# Patient Record
Sex: MALE | Race: WHITE | URBAN - NONMETROPOLITAN AREA
[De-identification: names, ages, dates, MRNs, and addresses within clinical notes are randomized per-mention and may not be internally consistent; named-entity substitution may affect disease eponyms.]

---

## 2021-06-26 ENCOUNTER — OFFICE VISIT (OUTPATIENT)
Dept: PRIMARY CARE CLINIC | Age: 36
End: 2021-06-26

## 2021-06-26 VITALS
HEART RATE: 60 BPM | DIASTOLIC BLOOD PRESSURE: 76 MMHG | WEIGHT: 170 LBS | BODY MASS INDEX: 29.02 KG/M2 | SYSTOLIC BLOOD PRESSURE: 128 MMHG | OXYGEN SATURATION: 99 % | HEIGHT: 64 IN | TEMPERATURE: 97.9 F

## 2021-06-26 DIAGNOSIS — R63.1 INCREASED THIRST: ICD-10-CM

## 2021-06-26 DIAGNOSIS — R06.02 SOB (SHORTNESS OF BREATH): ICD-10-CM

## 2021-06-26 DIAGNOSIS — G43.909 MIGRAINE WITHOUT STATUS MIGRAINOSUS, NOT INTRACTABLE, UNSPECIFIED MIGRAINE TYPE: Primary | ICD-10-CM

## 2021-06-26 LAB
BILIRUBIN, POC: NORMAL
BLOOD URINE, POC: NORMAL
CHP ED QC CHECK: PRESENT
CLARITY, POC: CLEAR
COLOR, POC: YELLOW
GLUCOSE BLD-MCNC: 96 MG/DL
GLUCOSE URINE, POC: NORMAL
KETONES, POC: NORMAL
LEUKOCYTE EST, POC: NORMAL
NITRITE, POC: NORMAL
PH, POC: 7.5
PROTEIN, POC: NORMAL
SPECIFIC GRAVITY, POC: 1.01
UROBILINOGEN, POC: 0.2

## 2021-06-26 PROCEDURE — 99202 OFFICE O/P NEW SF 15 MIN: CPT | Performed by: NURSE PRACTITIONER

## 2021-06-26 PROCEDURE — 82962 GLUCOSE BLOOD TEST: CPT | Performed by: NURSE PRACTITIONER

## 2021-06-26 PROCEDURE — 81002 URINALYSIS NONAUTO W/O SCOPE: CPT | Performed by: NURSE PRACTITIONER

## 2021-06-26 RX ORDER — NAPROXEN 500 MG/1
500 TABLET ORAL 2 TIMES DAILY PRN
Qty: 20 TABLET | Refills: 0 | Status: SHIPPED | OUTPATIENT
Start: 2021-06-26 | End: 2021-07-06

## 2021-06-26 RX ORDER — NAPROXEN 250 MG/1
250 TABLET ORAL 2 TIMES DAILY WITH MEALS
COMMUNITY
End: 2021-06-26 | Stop reason: DRUGHIGH

## 2021-06-26 NOTE — PATIENT INSTRUCTIONS
indicaciones. ? Si no está tomando un analgésico recetado, pregúntele a allen médico si puede mack saqib de [employer]. · Tenga cuidado de no mack analgésicos con mayor frecuencia que la permitida en las indicaciones porque los julio de ron podrían empeorar o aparecer con mayor frecuencia flor vez que el medicamento pierda allen [place]. · Preste atención a los nuevos síntomas que aparecen con el dolor de [place], [place], debilidad o entumecimiento, cambios en la visión o confusión. Podrían ser señales de un problema más grave. Para prevenir los julio de ron  · [employer] un diario de elijah julio de ron para que pueda averiguar qué los desencadena. Evitar los desencadenantes podría ayudar a prevenir los julio de [place]. Anote cuándo empieza cada dolor de [place], cuánto dura y cómo es el dolor (palpitante, emily, punzante o sordo). Anote cualquier otro síntoma que haya tenido con el dolor de [place], [place] náuseas, destellos de dolores o SIRI, o sensibilidad a la dolores brillante o a los ruidos kojo. Anote si el dolor de ron ocurrió cerca de allen menstruación. Enumere todos los factores que pudieran leelee desencadenado el dolor de [place], vaishali ciertos alimentos (chocolate, queso, vino) u olores, humo, luces brillantes, estrés o falta de sueño. · Encuentre maneras saludables de [facility]. Los julio de [place] son más comunes mio o simin después de un momento estresante. Tómese un tiempo para relajarse antes y después de hacer algo que le haya causado un dolor de ron en el pasado. · Trate de mantener elijah músculos relajados mediante flor buena postura. Revise si tiene [employer] de la May, la ezequiel, el barry y los hombros y trate de relajarlos. Cuando se siente en un escritorio, cambie de posición con frecuencia y estírese por 27 segundos cada hora. · Suzanne suficiente ejercicio y duerma bastante. · Coma en forma regular y tana.  Largos períodos sin comer pueden provocar un dolor de ron. · Regálese un masaje. Algunas personas encuentran que los masajes hechos con regularidad son M[employer] para aliviar la tensión. · Limite la cafeína. No demetrius demasiado café, té ni sodas. Gavin no deje de consumir cafeína de repente, porque eso también puede provocarle julio de [place]. · Reduzca la tensión en los ojos a causa de la computadora parpadeando con frecuencia y apartando la mirada de la pantalla a menudo. Asegúrese de tener lentes adecuados y de que allen monitor esté colocado de manera correcta, vaishali a un brazo de distancia. · Busque ayuda si tiene depresión o ansiedad. Lizzy julio de [place] podrían relacionarse con estas afecciones. El tratamiento puede prevenir los julio de [place] y ayudar con los síntomas de ansiedad o depresión. ¿Cuándo debe pedir ayuda? Llame al 911 en cualquier momento que piense que puede necesitar atención de emergencia. Por ejemplo, llame si:    · Tiene señales de un ataque cerebral. Estas pueden incluir:  ? Parálisis, entumecimiento o debilidad repentinos en la ezequiel, el brazo o la pierna, sobre todo si ocurre en un solo lado del cuerpo. ? Cambios repentinos en la visión. ? Dificultades repentinas para hablar. ? Confusión repentina o dificultad para comprender frases sencillas. ? Problemas repentinos para caminar o mantener el equilibrio. ? Dolor de [place] intenso y repentino, distinto de los julio de [place]. Llame a allen médico ahora mismo o busque atención médica inmediata si:    · Tiene un dolor de ron nuevo o peor.     · Allen dolor de ron empeora mucho. ¿Dónde puede encontrar más información en inglés? Addi Gricel a [email or web address] e ingrese a allen cuenta de MyChart. Laney Garcia F388 en el Martir Schw[employer] \"Search Health Information\" para más información (en inglés) sobre \"Dolor de ron: Instrucciones de cuidado. \"     Si no tiene flor cuenta, maximo blas en el enlace \"Sign Up Now\".   Revisado: 4 cuco, 2020               Versión del contenido: 12.9  © 2006-2021 Healthwise, Incorporated. Las instrucciones de cuidado fueron adaptadas bajo licencia por [facility] ([facility]). Si usted tiene [place] afección médica o sobre estas instrucciones, siempre pregunte a allen profesional de vianca. Healthwise, Incorporated niega toda garantía o responsabilidad por allen uso de esta información. Patient Education        naproxen  Jose:  Nasima Anaprox-DS, Midol Extended Relief, Naprelan 500, Naprosyn  ¿Cuál es la información más importante que nathaniel saber sobre naproxen? Naproxen puede aumentar allen riesgo de ataques al corazón o accidentes cerebrovasculares fatales. No use esta medicina simin antes o después de Morgan de lopez (bypass; aortocoronario con injerto, o CABG, por elijah siglas en inglés). Naproxen también puede causar sangrado del estómago o intestino, lo que puede ser fatal.  Endy Dust es naproxen? Naproxen es flor droga antiinflamatoria no esteroide (BRENDA; NSAID por elijah siglas en inglés). Naproxen se [place] para tratar el dolor o la inflamación causada por condiciones vaishali artritis, espondilitis anquilosante, bursitis, tendinitis, gota, o cólicos menstruales. Las tabletas de liberación retardada o de liberación prolongada son formas de naproxen de acción lenta que solo se usan para el tratamiento de enfermedades crónicas vaishali la artritis o la espondilitis anquilosante. Estas formas de naproxen no funcionan suficientemente rápido para tratar el dolor hafsa. Naproxen puede también usarse para fines no mencionados en esta guía del medicamento. ¿Qué debería discutir con [employer] del cuidado de la vianca antes de mack naproxen? Naproxen puede aumentar allen riesgo de ataques al corazón o accidentes cerebrovasculares fatales, aún si no tiene cualquier factores de riesgo.  No use esta medicina simin antes o después de Morgan de lopez (bypass; aortocoronario con injerto, o CABG, por elijah siglas en gopi). Naproxen también puede causar sangrado del estómago o intestino, lo que puede ser fatal. Estas condiciones pueden ocurrir sin ninguna advertencia mientras esté usando naproxen, especialmente en [place]. Usted no debe usar naproxen si es alérgico a éste, o si usted alguna vez ha tenido un ataque de asma o reacción alérgica severa después de pro aspirin o un BRENDA. Pregúntele a un médico antes de administrar naproxen a un valeri gonzalo de 12 años de [place]. Pregúntele a allen médico o farmacéutico si esta medicina es seguro de usar si usted tiene:  · enfermedad del corazón, presión arterial gigi, colesterol elevado, diabetes, o si fuma;  · un ataque al corazón, accidente cerebrovascular, o coágulo sanguíneo;  · úlceras o sangrados en el estómago;  · asma;  · enfermedad del hígado o riñón;  · retención de líquido; o  · si usted mercedes aspirin para prevenir un ataque al corazón o un accidente cerebrovascular. Si está embarazada, no debe pro naproxen a menos que allen médico se lo indique. Pro un Google las últimas 20 semanas del embarazo puede causar graves problemas del corazón o de los riñones en el carli cele y posibles complicaciones con allen embarazo. Es posible que no sea seguro amamantar mientras está usando esta [place]. Pregúntele a allen médico sobre cualquier riesgo. ¿Cómo nathaniel pro naproxen? Use exactamente vaishali indicado en la etiqueta, o vaisahli lo haya recetado allen médico. Use la dosis más baja que es efectiva en el tratamiento de allen condición. Agite la suspensión oral (líquida) antes de [employer] dosis. Use la jeringa de medición que viene con allen medicina, o use un dispositivo para la medición de dosis (no flor cuchara casera). [place] esta medicina con comida o leche si le molesta allen estómago. Siempre siga las direcciones en la etiqueta de la medicina sobre cómo darle esta medicina a un valeri. Las dosis de naproxen son basadas en el peso en los niños.  La dosis que allen valeri necesita puede cambiar si el valeri [address] Si usted [place] esta medicina a sunil plazo, usted puede necesitar pruebas médicas frecuentes. Esta medicina puede afectar los resultados de ciertas pruebas médicas. Dígale a cualquier médico que lo [place] que usted está usando naproxen. Guarde a temperatura ambiente fuera de la humedad, el calor, y la dolores. Mantenga la botella tana cerrada cuando no la esté usando. ¿Qué sucede si me mason flor dosis? Ya que naproxen se [place] cuando se necesita, usted gordon vez no esté en un horario de dosis. [place]ese cualquier dosis que dejó de usar si ya maddie es hora para la próxima dosis. No use dos dosis a la vez. Arthea Pear en flor sobredosis? Busque atención médica de emergencia o llame a la línea de [facility] al [phone].  ¿Qué nathaniel evitar mientras migdalia naproxen? Evite beber alcohol. Le puede aumentar allen riesgo de sangrado en el estómago. Evite mack aspirin a menos que allen médico se lo indique. Pregúntele a un médico o farmacéutico antes de usar otras medicinas para el dolor, fiebre, hinchazón, o síntomas de resfrío/gripe. Éstas pueden contener ingredientes similares a naproxen (vaishali aspirin, ibuprofen, o ketoprofen). Pregúntele a allen doctor antes de usar un antiácido, y use solamente el que allen médico recomienda. Algunos antiácidos pueden hacer más difícil que allen cuerpo absorba naproxen. ¿Cuáles son los efectos secundarios posibles de naproxen? Busque atención médica de emergencia si usted tiene signos de [place] reacción alérgica (nariz mocosa o congestionada, sibilancias o dificultad para respirar, ronchas, hinchazón en la ezequiel o garganta) o flor reacción severa de la piel (fiebre, dolor de garganta, quemazón en elijah ojos, dolor de la piel, sarpullido gama o púrpura con ampollas y descamación).   Busque ayuda médica de emergencia si usted tiene signos de un ataque al corazón o accidente cerebrovascular: dolor del pecho que se extiende a la mandíbula u hombro, entumecimiento o debilidad repentina en un lado de allen cuerpo, habla arrastrada, hinchazón en las piernas, sensación de que le falta aire al respirar. Deje de usar naproxen y llame a allen médico de inmediato si usted tiene:  · falta de aire al respirar (aún cuando la actividad es leve);  · hinchazón o aumento rápido de peso;  · el primer signo de cualquier sarpullido o ampolla, no importa que sea muy leve;  · signos de [employer] --heces con abner o alquitranadas, tos con abner o vómito que parece café molido;  · problemas del hígado --náusea, dolor en la parte superior del [place], pérdida del apetito, [place], heces de color arcilla, ictericia (color amarillo de la piel u ojos);  · problemas del riñón --orinar poco o nada, dolor al orinar, hinchazón en elijah pies o tobillos; o  · bajo conteo de glóbulos rojos (anemia) --piel pálida, cansancio inusual, sentir que se va a desmayar o le falta aire al respirar, massimo y pies fríos. Efectos secundarios comunes pueden incluir:  · dolor de [place];  · indigestión, acidez, dolor de [place]; o  · síntomas de la gripe. Esta lista no menciona todos los efectos secundarios y puede ser que ocurran otros. Llame a allen médico para consejos médicos relacionados a efectos secundarios. [facility] puede reportar efectos secundarios llamando al FDA al 1-800-FDA-1088. ¿Qué otras drogas afectarán a naproxen? Pregúntele a allen médico antes de usar naproxen si usted mercedes un antidepresivo. Pro ciertos antidepresivos con un BRENDA puede causarle moretones o sangrado fácil.   [place] a un médico o farmacéutico antes de usar naproxen con cualquier otra medicina, especialmente:  · otros AINEs o salicylates (diflunisal, salsalate);  · antiácidos o sucralfate;  · cholestyramine;  · cyclosporine;  · digoxin;  · lithium;  · methotrexate;  · pemetrexed;  · probenecid;  · warfarin (Coumadin, Jantoven) o anticoagulante similares;  · un diurético o \"pastilla para eliminar el agua\"; aquí no se ha creado con la intención de cubrir todos los usos posibles, instrucciones, precauciones, advertencias, interacciones con otras drogas, reacciones alérgicas, o efectos secundarios. Si usted tiene alguna pregunta acerca de las drogas que está tomando, consulte con allen médico, [place], o farmacéutico.  Copyright 1008-2856 [facility] Version: 18.01. Revision date: 12/29/2020. Las instrucciones de cuidado fueron adaptadas bajo licencia por [facility] CARE ([facility]). Si usted tiene [place] afección médica o sobre estas instrucciones, siempre pregunte a allen profesional de vianca. [facility] niega toda garantía o responsabilidad por allen uso de esta información.

## 2021-06-26 NOTE — PROGRESS NOTES
Chief Complaint:   Fatigue (Patient complains of fatigue x 3 days. Complains of feeling weak. ) and Headache (Patient complains of headache x 3 days. No fever. )      History of Present Illness   Source of history provided by: patient and private  Keely Walker. Discussed and offered  phone, patient declines. Larissa Valenzuela is a 29 y.o. old male who has a past medical history of: History reviewed. No pertinent past medical history. presents to the walk in clinic for evaluation of a headache which has been present x 3 days. Reports associated photophobia. HA \"x3 days and feels tired and a little bit weak most of the day. Once he starts working, feels more tired and this is unusual.  A lot of thirst.  Upper back and shoulder pain with head ache. Thirst seems to be increased from his usual, for the last 2-3 days. Dry mouth. Has lost some weight, \"5 kgs\". Some SOB. No nausea or vomiting episodes. Since recognized, the symptoms have been improving. Denies this being the worst HA of his/her life. Pt denies any recent falls, trauma, dizziness, syncope, CP, palpitations, nasal congestion, visual loss, unilateral weakness, fever, neck stiffness, or recent illness. Denies HA now, improved. HA is described as \"all over head, ometimes dizzy and funny vision in one of my eyes\" then head starts. Using Naproxen OTC with some results. 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 No PCP. Will be in [facility] until November. Arrived to [place] in May from [facility]. Fasting now.     ROS   Unless otherwise stated in this report or unable to obtain because of the patient's clinical or mental status as evidenced by the medical record, this patients's positive and negative responses for Review of Systems, constitutional, psych, eyes, ENT, cardiovascular, respiratory, gastrointestinal, neurological, genitourinary, musculoskeletal, integument systems all orders for this visit:    Migraine without status migrainosus, not intractable, unspecified migraine type  -     naproxen (NAPROSYN) 500 MG tablet; Take 1 tablet by mouth 2 times daily as needed (for headache. Take with food.)    Increased thirst  -     POCT Urinalysis no Micro  -     POCT Glucose  -     Orthostatic blood pressure and pulse    SOB (shortness of breath)  -     COVID-19; Future    Disposition:  Disposition: Discharge to home. Discussed differential dx:  Migraine HA, dehydration, diabetes, Covid-19. Currently no headache. POCT UA and glucose wnl at clinic. Orthostatics wnl. Will send for Covid-19 testing today, will call with results. Treating with Naproxen,  administration and side effects discussed. Increase fluid intake and rest in a dark, quiet place. Return in 3-5 days if symptoms persist. ED sooner if symptoms worsen or change. ED immediately with severe or worsening HA, weakness, paresthesias, visual loss/changes, vomiting, fever, neck stiffness, or worst HA of life. Pt states understanding and is in agreement with this care plan. All questions answered.     USMAN Whipple - CNP